# Patient Record
Sex: FEMALE | Race: WHITE
[De-identification: names, ages, dates, MRNs, and addresses within clinical notes are randomized per-mention and may not be internally consistent; named-entity substitution may affect disease eponyms.]

---

## 2021-02-09 ENCOUNTER — HOSPITAL ENCOUNTER (INPATIENT)
Dept: HOSPITAL 46 - FBCO | Age: 31
LOS: 2 days | Discharge: HOME | End: 2021-02-11
Attending: GENERAL PRACTICE | Admitting: GENERAL PRACTICE
Payer: COMMERCIAL

## 2021-02-09 VITALS — BODY MASS INDEX: 50.89 KG/M2 | HEIGHT: 57.01 IN | WEIGHT: 235.89 LBS

## 2021-02-09 DIAGNOSIS — D64.9: ICD-10-CM

## 2021-02-09 DIAGNOSIS — F41.9: ICD-10-CM

## 2021-02-09 DIAGNOSIS — E66.9: ICD-10-CM

## 2021-02-09 DIAGNOSIS — Z79.899: ICD-10-CM

## 2021-02-09 DIAGNOSIS — Z20.822: ICD-10-CM

## 2021-02-09 DIAGNOSIS — F12.90: ICD-10-CM

## 2021-02-09 DIAGNOSIS — Z3A.39: ICD-10-CM

## 2021-02-09 PROCEDURE — U0003 INFECTIOUS AGENT DETECTION BY NUCLEIC ACID (DNA OR RNA); SEVERE ACUTE RESPIRATORY SYNDROME CORONAVIRUS 2 (SARS-COV-2) (CORONAVIRUS DISEASE [COVID-19]), AMPLIFIED PROBE TECHNIQUE, MAKING USE OF HIGH THROUGHPUT TECHNOLOGIES AS DESCRIBED BY CMS-2020-01-R: HCPCS

## 2021-02-09 PROCEDURE — 3E0R3BZ INTRODUCTION OF ANESTHETIC AGENT INTO SPINAL CANAL, PERCUTANEOUS APPROACH: ICD-10-PCS | Performed by: NURSE ANESTHETIST, CERTIFIED REGISTERED

## 2021-02-09 PROCEDURE — C9803 HOPD COVID-19 SPEC COLLECT: HCPCS

## 2021-02-09 PROCEDURE — 00HU33Z INSERTION OF INFUSION DEVICE INTO SPINAL CANAL, PERCUTANEOUS APPROACH: ICD-10-PCS | Performed by: NURSE ANESTHETIST, CERTIFIED REGISTERED

## 2021-02-09 PROCEDURE — A9270 NON-COVERED ITEM OR SERVICE: HCPCS

## 2021-02-09 NOTE — NUR
INTERPATH RAPID COVID TEST DONE PER  ORDER. COVID TEST COLLECTED FROM BOTH
NARES. LEFT SIDE HAD BLOCKAGE. RIGHT SIDE WAS GOOD. PT TOLERATED FAIR.

## 2021-02-09 NOTE — NUR
02/09/21 0820 Mami Alvarez
0813 PATIENT ARRIVES TO [facility] ROOM 101 AWAKE. RESP EVEN AND UNLABORED,
ROOM AIR SATS >96%. DENIES PAIN OR NAUSEA.
0818 PATIENT AWAKE, BABY TO BEDSIDE. RESP EVEN AND UNLABORED, ROOM AIR
SATS >96%. CONTINUES TO DENY PAIN AND NAUSEA.

## 2021-02-10 NOTE — PR
[facility]
                                    [address]
                                  [place], [place]  [address]
_________________________________________________________________________________________
                                                                 Signed   
 
 
===================================
PP Progress Notes
===================================
Datetime Report Generated by CPN: 02/10/2021 13:21
   
SUBJECTIVE:  [id number]
Pain:  Within Normal Limits
Nausea/Vomiting:  Denies
Vital Signs:  [id number]
Vital Signs:  Reviewed; Within Normal Limits
Notable Details:  PP Hgb/Hct = 9.7/30.0
POSTPARTUM EXAM:  Ongoing
Abdomen/Uterus:  Normal
Lochia:  Normal
Extremities:  Normal
 Incision:  Normal
IMPRESSION/PLAN/PROCEDURES:  [id number]
Impression:  Normal Postpartum Progression
Other Impression:  Anemia
Plan:  Continue Present Management
Procedures:  None
Progress Notes:  Doing well, without complaint, tolerating food well, voiding without
   difficulty.  Increase activity as tolerated.
Signing Physician:  Frederick Browne MD
 
 
Copies:                                
~
 
 
 
 
 
 
 
 
 
 
 
 
 
 
 
*Electronically Signed*  02/10/21  1321  FREDERICK BROWNE MD      
                                                                       
_________________________________________________________________________________________
PATIENT NAME:     ALAN CR                     
MEDICAL RECORD #: [id number]                     PROGRESS NOTE                 
          ACCT #: [id number]  
DATE OF BIRTH:   [birth year]                                       
PHYSICIAN:   FREDERICK BROWNE MD             RPT #: [id number]
REPORT IS CONFIDENTIAL AND NOT TO BE RELEASED WITHOUT AUTHORIZATION

## 2021-02-11 NOTE — OR
[facility]
                                    [address] [place] [address]  [address]
_________________________________________________________________________________________
                                                                 Signed   
 
 
DATE OF OPERATION:
2021
 
SURGEON:
Son Schmid MD
 
Patient of Dr. Schmid.
 
PREOPERATIVE DIAGNOSES:
Fetal distress with fetal bradycardia, meconium and active labor.
 
POSTOPERATIVE DIAGNOSES:
Fetal distress with fetal bradycardia, meconium and active labor, posterior
presentation. Possible occult cord prolapse.
 
PROCEDURE:
Emergency low transverse segment primary  section, delivery of a live male
infant. 
 
ASSISTANT:
Dr. Serrato.
 
ANESTHESIA:
Epidural.
 
ESTIMATED BLOOD LOSS:
500 mL.
 
COMPLICATIONS:
None.
 
DRAINS:
Matos to bladder.
 
FINDINGS:
Live male infant, Apgars 8 and 9.  Weight 7 pounds 8 ounces.  Moderate meconium.  Normal
tubes and ovaries bilateral. 
 
DESCRIPTION OF PROCEDURE:
The patient was brought into the operating room, placed in supine position.  The patient
already had epidural, which was re-dosed quickly and the patient already had Matos
catheter in place, so the patient was rapidly prepped and draped in usual sterile
 
    Electronically Signed By: SON SCHMID MD  21 1013
_________________________________________________________________________________________
PATIENT NAME:     ALAN CR                      
MEDICAL RECORD #: [id number]            OPERATIVE REPORT              
          ACCT #: [id number]  
DATE OF BIRTH:   [birth year]            REPORT #: [id number]      
PHYSICIAN:        SON SCHMID MD      
PCP:              KENNEDI SINGH             
REPORT IS CONFIDENTIAL AND NOT TO BE RELEASED WITHOUT AUTHORIZATION
 
 
                                  [address]
                                  [place], [place]  [address]
_________________________________________________________________________________________
                                                                 Signed   
 
 
fashion.  A Pfannenstiel skin incision was made with a scalpel and extended through the
subcutaneous tissue with the scalpel and finger dissection.  The fascia was nicked with
scalpel and extended in transverse fashion using curved scissors.  The underlying
abdominal musculature was bluntly and sharply  from the fascia above and below
the incision.  The abdominal musculature was bluntly  along the midline along
with the peritoneum.  The Karel self-retaining retractor was inserted into the incision
and tightened in place.  The lower uterine segment was carefully nicked with scalpel and
extended in transverse fashion using finger dissection.  Meconium fluid and cord came
out of the incision.  The infant was noted to be in ROP presentation.  The infant's head
easily delivered from the incision.  The rest of the infant was easily delivered from
the incision and the cord doubly clamped and cut.  The infant passed off the table in
fair condition to the awaiting nurse.  Cord gases were obtained and then the placenta
manually removed.  Uterine cavity explored with a lap pad to remove any retained
membranes.  An angle stitch of 0-Monocryl suture was placed at one end of the
incision and a 2nd running locking stitch of 0-Monocryl starting at the other end used
to close the incision.  A 2nd running stitch of 0 Monocryl was used to imbricate the 1st
layer.  Good hemostasis was noted.  The entire pelvis was irrigated, suctioned,
examined, and noted to have several superficial bleeding spots in the lower uterine
segment.  These were cauterized with the Bovie.  There was still some slight oozing and
no particular bleeding spots, so the lower uterine segment was sprinkled with Alejandro,
which did seem to control any further bleeding.  The Karel self-retaining retractor was
removed from the incision and a sheet of ACell placed over the lower uterine segment to
help with healing.  The anterior wall peritoneum was closed using running stitch of 2-0
Vicryl suture.  The abdominal musculature was reapproximated using interrupted stitches
of 0 Vicryl suture.  The abdominal musculature irrigated, suctioned, examined and any
bleeding spots cauterized with the Bovie.  Again, there were several areas of oozing,
but no particular bleeding spots, so the remainder of the Alejandro was sprinkled over the
abdominal musculature and good hemostasis was noted.  Powdered ACell sprinkled over the
abdominal musculature after this to help with healing, good hemostasis was noted.  The
fascia was then closed using two running stitches of 0 Vicryl suture meeting in the
midline.  Subcutaneous tissue was irrigated, suctioned, examined, and any bleeding spots
were cauterized with Bovie.  Subcutaneous tissue was then closed using interrupted
stitches of 3-0 Vicryl suture and the skin reapproximated using skin clips.  The patient
tolerated the procedure well and went to the recovery room in good condition.  Cord
gases were sent to the lab.  Sponge, needle, and instrument counts were correct at the
end of the procedure. 
 
 
 
            ________________________________________
            Son Schmid MD 
 
 
    Electronically Signed By: SON SCHMID MD  21 1013
_________________________________________________________________________________________
PATIENT NAME:     ALAN CR                      
MEDICAL RECORD #: [id number]            OPERATIVE REPORT              
          ACCT #: [id number]  
DATE OF BIRTH:   [birth year]            REPORT #: [id number]      
PHYSICIAN:        SON SCHMID MD      
PCP:              KENNEDI SINGH             
REPORT IS CONFIDENTIAL AND NOT TO BE RELEASED WITHOUT AUTHORIZATION
 
 
                                  [address]  [address]
_________________________________________________________________________________________
                                                                 Signed   
 
 
 
[facility]
Job #:  [id number]
DD:  2021 10:56:01
DT:  2021 16:27:30
 
 
Copies:                                
~
 
 
 
 
 
 
 
 
 
 
 
 
 
 
 
 
 
 
 
 
 
 
 
 
 
 
 
 
 
 
 
 
 
 
    Electronically Signed By: SON SCHMID MD  21 1013
_________________________________________________________________________________________
PATIENT NAME:     ALAN CR                      
MEDICAL RECORD #: [id number]            OPERATIVE REPORT              
          ACCT #: [id number]  
DATE OF BIRTH:   [birth year]            REPORT #: [id number]      
PHYSICIAN:        SON SCHMID MD      
PCP:              KENNEDI SINGH             
REPORT IS CONFIDENTIAL AND NOT TO BE RELEASED WITHOUT AUTHORIZATION

## 2021-02-11 NOTE — PR
[facility]
                                    [address]
                                  [place], [place]  [address]
_________________________________________________________________________________________
                                                                 Signed   
 
 
===================================
PP Progress Notes
===================================
Datetime Report Generated by CPN: 2021 08:47
   
SUBJECTIVE:  [id number]
Pain:  Within Normal Limits
Nausea/Vomiting:  Present
Flatus:  Yes
Bowel Movement:  No
Vital Signs:  [id number]
Vital Signs:  Reviewed; Within Normal Limits
Notable Details:  PP Hgb/Hct = 9.7/30.0
POSTPARTUM EXAM:  Ongoing
Abdomen/Uterus:  Normal
Lochia:  Normal
Extremities:  Normal
 Incision:  Normal
Exam Comments:  Abdomen soft, nondistended, normal bowel sounds
IMPRESSION/PLAN/PROCEDURES:  [id number]
Impression:  Normal Postpartum Progression
Other Impression:  Anemia
Plan:  Continue Present Management
Procedures:  None
Progress Notes:  Continue with fluids but decrease solid food, increase activity, Zofran
   as needed.  Will continue monitoring, expect bowel function to return shortly.
Signing Physician:  Frederick Browne MD
 
 
Copies:                                
~
 
 
 
 
 
 
 
 
 
 
 
 
*Electronically Signed*  21  0847  FREDERICK BROWNE MD      
                                                                       
_________________________________________________________________________________________
PATIENT NAME:     ALAN CR                     
MEDICAL RECORD #: [id number]                     PROGRESS NOTE                 
          ACCT #: [id number]  
DATE OF BIRTH:   [birth year]                                       
PHYSICIAN:   FREDERICK BROWNE MD             RPT #: [id number]
REPORT IS CONFIDENTIAL AND NOT TO BE RELEASED WITHOUT AUTHORIZATION

## 2021-02-11 NOTE — PR
[facility]
                                    [address]
                                  [place], [place]  [address]
_________________________________________________________________________________________
                                                                 Signed   
 
 
===================================
PP Progress Notes
===================================
Datetime Report Generated by CPN: 2021 13:06
   
SUBJECTIVE:  [id number]
Pain:  Within Normal Limits
Nausea/Vomiting:  Denies
Flatus:  Yes
Bowel Movement:  Yes
Vital Signs:  [id number]
Vital Signs:  Reviewed; Within Normal Limits
Notable Details:  PP Hgb/Hct = 9.7/30.0
POSTPARTUM EXAM:  Ongoing
Abdomen/Uterus:  Normal
Lochia:  Normal
Extremities:  Normal
 Incision:  Normal
Exam Comments:  Abdomen soft, nondistended, normal bowel sounds
IMPRESSION/PLAN/PROCEDURES:  [id number]
Impression:  Normal Postpartum Progression
Other Impression:  Anemia
Plan:  Discharge
Procedures:  None
Progress Notes:  Feeling much better, no more nausea or vomiting after bowel movement x2.
   Wants to go home.
Signing Physician:  Frederick Browne MD
 
 
Copies:                                
~
 
 
 
 
 
 
 
 
 
 
 
 
*Electronically Signed*  21  1306  FREDERICK BROWNE MD      
                                                                       
_________________________________________________________________________________________
PATIENT NAME:     ALAN CR                     
MEDICAL RECORD #: [id number]                     PROGRESS NOTE                 
          ACCT #: [id number]  
DATE OF BIRTH:   [birth year]                                       
PHYSICIAN:   FREDERICK BROWNE MD             RPT #: [id number]
REPORT IS CONFIDENTIAL AND NOT TO BE RELEASED WITHOUT AUTHORIZATION